# Patient Record
Sex: FEMALE | URBAN - METROPOLITAN AREA
[De-identification: names, ages, dates, MRNs, and addresses within clinical notes are randomized per-mention and may not be internally consistent; named-entity substitution may affect disease eponyms.]

---

## 2020-11-17 ENCOUNTER — APPOINTMENT (RX ONLY)
Dept: URBAN - METROPOLITAN AREA CLINIC 134 | Facility: CLINIC | Age: 65
Setting detail: DERMATOLOGY
End: 2020-11-17

## 2020-11-17 VITALS — TEMPERATURE: 96.98 F

## 2020-11-17 DIAGNOSIS — L30.1 DYSHIDROSIS [POMPHOLYX]: ICD-10-CM

## 2020-11-17 DIAGNOSIS — L26 EXFOLIATIVE DERMATITIS: ICD-10-CM

## 2020-11-17 PROCEDURE — ? COUNSELING

## 2020-11-17 PROCEDURE — ? PRESCRIPTION

## 2020-11-17 PROCEDURE — ? TREATMENT REGIMEN

## 2020-11-17 PROCEDURE — 99213 OFFICE O/P EST LOW 20 MIN: CPT

## 2020-11-17 RX ORDER — FLUOCINONIDE GEL 0.5 MG/G
GEL TOPICAL
Qty: 1 | Refills: 1 | Status: ERX | COMMUNITY
Start: 2020-11-17

## 2020-11-17 RX ADMIN — FLUOCINONIDE GEL: 0.5 GEL TOPICAL at 00:00

## 2020-11-17 ASSESSMENT — LOCATION DETAILED DESCRIPTION DERM
LOCATION DETAILED: LEFT ULNAR PALM
LOCATION DETAILED: RIGHT ULNAR PALM
LOCATION DETAILED: RIGHT THENAR EMINENCE

## 2020-11-17 ASSESSMENT — LOCATION ZONE DERM
LOCATION ZONE: HAND
LOCATION ZONE: HAND

## 2020-11-17 ASSESSMENT — LOCATION SIMPLE DESCRIPTION DERM
LOCATION SIMPLE: LEFT HAND
LOCATION SIMPLE: RIGHT HAND
LOCATION SIMPLE: RIGHT HAND

## 2020-11-17 NOTE — PROCEDURE: TREATMENT REGIMEN
Continue Regimen: Wearing gloves when cleaning and baking
Initiate Treatment: Apply Fluocinonide 0.05% gel BID \\nApply Amlactin* a couple of times during the day
Discontinue Regimen: Keflex 500mg
Detail Level: Zone

## 2021-02-23 ENCOUNTER — APPOINTMENT (RX ONLY)
Dept: URBAN - METROPOLITAN AREA CLINIC 134 | Facility: CLINIC | Age: 66
Setting detail: DERMATOLOGY
End: 2021-02-23

## 2021-02-23 VITALS — TEMPERATURE: 96.8 F

## 2021-02-23 DIAGNOSIS — L98.8 OTHER SPECIFIED DISORDERS OF THE SKIN AND SUBCUTANEOUS TISSUE: ICD-10-CM

## 2021-02-23 PROCEDURE — ? ADDITIONAL NOTES

## 2021-02-23 PROCEDURE — 99213 OFFICE O/P EST LOW 20 MIN: CPT

## 2021-02-23 PROCEDURE — ? PRESCRIPTION

## 2021-02-23 PROCEDURE — ? COUNSELING

## 2021-02-23 PROCEDURE — ? TREATMENT REGIMEN

## 2021-02-23 RX ORDER — EMOLLIENT COMBINATION NO.10
EMULSION (GRAM) TOPICAL
Qty: 1 | Refills: 1 | Status: ERX | COMMUNITY
Start: 2021-02-23

## 2021-02-23 RX ADMIN — Medication: at 00:00

## 2021-02-23 ASSESSMENT — LOCATION DETAILED DESCRIPTION DERM: LOCATION DETAILED: RIGHT PLANTAR FOREFOOT OVERLYING 1ST METATARSAL

## 2021-02-23 ASSESSMENT — LOCATION SIMPLE DESCRIPTION DERM: LOCATION SIMPLE: RIGHT PLANTAR SURFACE

## 2021-02-23 ASSESSMENT — LOCATION ZONE DERM: LOCATION ZONE: FEET

## 2021-02-23 NOTE — PROCEDURE: ADDITIONAL NOTES
Additional Notes: Dry in between toes significantly before wearing socks.  Apply Biafine QID until healed. Her feet tend to sweat a lot so I have suggested that she try and wear wick away socks to try and keep as dry as possible. She may use a pumice stone to remove her callus.
Detail Level: Simple
Render Risk Assessment In Note?: no

## 2021-02-23 NOTE — HPI: SKIN LESION
How Severe Is Your Skin Lesion?: mild
Has Your Skin Lesion Been Treated?: not been treated
Is This A New Presentation, Or A Follow-Up?: Skin Lesion
Additional History: She applied new skin to the area last night and this did burn however she was trying to help it from becoming worse. She is not diabetic.

## 2021-03-25 RX ORDER — FLUOCINONIDE GEL 0.5 MG/G
GEL TOPICAL
Qty: 1 | Refills: 1 | Status: ERX

## 2021-04-01 RX ORDER — EMOLLIENT COMBINATION NO.10
EMULSION (GRAM) TOPICAL
Qty: 1 | Refills: 1 | Status: ERX

## 2022-04-28 ENCOUNTER — APPOINTMENT (RX ONLY)
Dept: URBAN - METROPOLITAN AREA CLINIC 134 | Facility: CLINIC | Age: 67
Setting detail: DERMATOLOGY
End: 2022-04-28

## 2022-04-28 VITALS — HEIGHT: 63 IN | WEIGHT: 293 LBS

## 2022-04-28 DIAGNOSIS — D18.0 HEMANGIOMA: ICD-10-CM

## 2022-04-28 DIAGNOSIS — L85.3 XEROSIS CUTIS: ICD-10-CM

## 2022-04-28 DIAGNOSIS — D22 MELANOCYTIC NEVI: ICD-10-CM

## 2022-04-28 DIAGNOSIS — L91.8 OTHER HYPERTROPHIC DISORDERS OF THE SKIN: ICD-10-CM

## 2022-04-28 DIAGNOSIS — L81.4 OTHER MELANIN HYPERPIGMENTATION: ICD-10-CM

## 2022-04-28 DIAGNOSIS — Z71.89 OTHER SPECIFIED COUNSELING: ICD-10-CM

## 2022-04-28 DIAGNOSIS — L82.1 OTHER SEBORRHEIC KERATOSIS: ICD-10-CM

## 2022-04-28 PROBLEM — D22.5 MELANOCYTIC NEVI OF TRUNK: Status: ACTIVE | Noted: 2022-04-28

## 2022-04-28 PROBLEM — D18.01 HEMANGIOMA OF SKIN AND SUBCUTANEOUS TISSUE: Status: ACTIVE | Noted: 2022-04-28

## 2022-04-28 PROCEDURE — 99213 OFFICE O/P EST LOW 20 MIN: CPT

## 2022-04-28 PROCEDURE — ? COUNSELING

## 2022-04-28 ASSESSMENT — LOCATION ZONE DERM
LOCATION ZONE: TRUNK
LOCATION ZONE: LEG
LOCATION ZONE: ARM
LOCATION ZONE: FACE
LOCATION ZONE: AXILLAE

## 2022-04-28 ASSESSMENT — LOCATION DETAILED DESCRIPTION DERM
LOCATION DETAILED: RIGHT AXILLARY VAULT
LOCATION DETAILED: RIGHT PROXIMAL DORSAL FOREARM
LOCATION DETAILED: LEFT SUPERIOR LATERAL MIDBACK
LOCATION DETAILED: SUPERIOR THORACIC SPINE
LOCATION DETAILED: LEFT PROXIMAL DORSAL FOREARM
LOCATION DETAILED: PERIUMBILICAL SKIN
LOCATION DETAILED: RIGHT ANTERIOR DISTAL THIGH
LOCATION DETAILED: RIGHT INFERIOR CENTRAL MALAR CHEEK

## 2022-04-28 ASSESSMENT — LOCATION SIMPLE DESCRIPTION DERM
LOCATION SIMPLE: RIGHT CHEEK
LOCATION SIMPLE: ABDOMEN
LOCATION SIMPLE: RIGHT FOREARM
LOCATION SIMPLE: UPPER BACK
LOCATION SIMPLE: LEFT LOWER BACK
LOCATION SIMPLE: LEFT FOREARM
LOCATION SIMPLE: RIGHT THIGH
LOCATION SIMPLE: RIGHT AXILLARY VAULT

## 2022-04-28 NOTE — PROCEDURE: COUNSELING
Detail Level: Generalized
Detail Level: Detailed
Patient Specific Counseling (Will Not Stick From Patient To Patient): Gave patient samples of eucerin roughness relief.

## 2023-04-28 ENCOUNTER — APPOINTMENT (RX ONLY)
Dept: URBAN - METROPOLITAN AREA CLINIC 133 | Facility: CLINIC | Age: 68
Setting detail: DERMATOLOGY
End: 2023-04-28

## 2023-04-28 VITALS — WEIGHT: 239 LBS | HEIGHT: 64 IN

## 2023-04-28 DIAGNOSIS — L81.4 OTHER MELANIN HYPERPIGMENTATION: ICD-10-CM

## 2023-04-28 DIAGNOSIS — L91.8 OTHER HYPERTROPHIC DISORDERS OF THE SKIN: ICD-10-CM

## 2023-04-28 DIAGNOSIS — L60.8 OTHER NAIL DISORDERS: ICD-10-CM

## 2023-04-28 PROCEDURE — ? WOOD'S LAMP

## 2023-04-28 PROCEDURE — ? DEFER

## 2023-04-28 PROCEDURE — ? COUNSELING

## 2023-04-28 PROCEDURE — 99213 OFFICE O/P EST LOW 20 MIN: CPT

## 2023-04-28 PROCEDURE — ? TREATMENT REGIMEN

## 2023-04-28 ASSESSMENT — LOCATION DETAILED DESCRIPTION DERM
LOCATION DETAILED: RIGHT AXILLARY VAULT
LOCATION DETAILED: RIGHT MIDDLE FINGERNAIL
LOCATION DETAILED: LEFT INDEX FINGERNAIL
LOCATION DETAILED: LEFT MIDDLE FINGER LUNULA
LOCATION DETAILED: RIGHT INDEX FINGERNAIL
LOCATION DETAILED: RIGHT RING FINGER LUNULA
LOCATION DETAILED: LEFT CENTRAL MALAR CHEEK
LOCATION DETAILED: LEFT RING FINGER LUNULA

## 2023-04-28 ASSESSMENT — LOCATION SIMPLE DESCRIPTION DERM
LOCATION SIMPLE: LEFT INDEX FINGERNAIL
LOCATION SIMPLE: RIGHT RING FINGERNAIL
LOCATION SIMPLE: RIGHT AXILLARY VAULT
LOCATION SIMPLE: LEFT RING FINGERNAIL
LOCATION SIMPLE: LEFT MIDDLE FINGERNAIL
LOCATION SIMPLE: RIGHT INDEX FINGERNAIL
LOCATION SIMPLE: LEFT CHEEK
LOCATION SIMPLE: RIGHT MIDDLE FINGERNAIL

## 2023-04-28 ASSESSMENT — LOCATION ZONE DERM
LOCATION ZONE: FACE
LOCATION ZONE: AXILLAE
LOCATION ZONE: FINGERNAIL

## 2023-04-28 NOTE — HPI: SKIN LESION
What Type Of Note Output Would You Prefer (Optional)?: Standard Output
How Severe Is Your Skin Lesion?: mild
Is This A New Presentation, Or A Follow-Up?: Growth
Is This A New Presentation, Or A Follow-Up?: Skin Lesion
Additional History: States it is a skin tag

## 2023-04-28 NOTE — HPI: NAIL DISORDER
Is This A New Presentation, Or A Follow-Up?: Nail Disorder
How Severe Is It?: mild
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

## 2023-04-28 NOTE — PROCEDURE: TREATMENT REGIMEN
Initiate Treatment: Purchase glass nail buffer from [employer]
Detail Level: Zone
Samples Given: SA cerave psoriasis
Continue Regimen: Tinted sunscreen
Initiate Treatment: SA psoriasis moisturizer, can help lift pigment, daily

## 2023-04-28 NOTE — PROCEDURE: DEFER
Size Of Lesion In Cm (Optional): 0
Procedure To Be Performed At Next Visit: Skin Tag removal (Cosmetic)
Detail Level: Detailed
Introduction Text (Please End With A Colon): The following procedure was deferred:

## 2024-07-10 ENCOUNTER — APPOINTMENT (RX ONLY)
Dept: URBAN - METROPOLITAN AREA CLINIC 133 | Facility: CLINIC | Age: 69
Setting detail: DERMATOLOGY
End: 2024-07-10

## 2024-07-10 DIAGNOSIS — Z41.9 ENCOUNTER FOR PROCEDURE FOR PURPOSES OTHER THAN REMEDYING HEALTH STATE, UNSPECIFIED: ICD-10-CM

## 2024-07-10 PROCEDURE — ? INVENTORY

## 2024-07-10 PROCEDURE — ? DIAMONDGLOW

## 2024-07-10 ASSESSMENT — LOCATION ZONE DERM
LOCATION ZONE: FACE
LOCATION ZONE: NOSE

## 2024-07-10 ASSESSMENT — LOCATION DETAILED DESCRIPTION DERM
LOCATION DETAILED: RIGHT MEDIAL EYEBROW
LOCATION DETAILED: NASAL SUPRATIP

## 2024-07-10 ASSESSMENT — LOCATION SIMPLE DESCRIPTION DERM
LOCATION SIMPLE: NOSE
LOCATION SIMPLE: RIGHT EYEBROW

## 2024-07-10 NOTE — PROCEDURE: DIAMONDGLOW
Detail Level: Zone
Post-Care Instructions: I reviewed with the patient in detail post-care instructions. Patient should stay away from the sun and wear sun protection until treated areas are fully healed.
Intelliflow Settin%
Solution Used: Skin Brightening
Body Treatment Head Used?: Not Used
Comments (Will Appear Before The Postcare): extraction performed on nose
Endpoint: mild erythema
Vacuum Pressure In Inches: 3
Consent: Written consent obtained, risks reviewed including but not limited to crusting, scabbing, blistering, scarring, darker or lighter pigmentary change, bruising, and/or incomplete response.
Facial Treatment Head Used?: Facial: Very Fine (140 grit) 6 mm

## 2024-08-21 ENCOUNTER — APPOINTMENT (RX ONLY)
Dept: URBAN - METROPOLITAN AREA CLINIC 133 | Facility: CLINIC | Age: 69
Setting detail: DERMATOLOGY
End: 2024-08-21

## 2024-08-21 DIAGNOSIS — Z41.9 ENCOUNTER FOR PROCEDURE FOR PURPOSES OTHER THAN REMEDYING HEALTH STATE, UNSPECIFIED: ICD-10-CM

## 2024-08-21 PROCEDURE — ? INVENTORY

## 2024-08-21 PROCEDURE — ? DIAMONDGLOW

## 2024-08-21 ASSESSMENT — LOCATION SIMPLE DESCRIPTION DERM
LOCATION SIMPLE: LEFT FOREHEAD
LOCATION SIMPLE: NOSE

## 2024-08-21 ASSESSMENT — LOCATION DETAILED DESCRIPTION DERM
LOCATION DETAILED: NASAL DORSUM
LOCATION DETAILED: LEFT INFERIOR MEDIAL FOREHEAD

## 2024-08-21 ASSESSMENT — LOCATION ZONE DERM
LOCATION ZONE: FACE
LOCATION ZONE: NOSE

## 2024-08-21 NOTE — PROCEDURE: DIAMONDGLOW
Intelliflow Settin%
Comments (Will Appear Before The Postcare): excoriated lesion on bridge of nose pt reports \"large pore\" present 2+ weeks. Discussed it is likely a sebaceous hyperplasia however may need evaluated/ treated by physician. If worsens or fails to heal pt will schedule evaluation by physician.
Facial Treatment Head Used?: Facial: Fine (120 grit) 6 mm
Consent: Written consent obtained, risks reviewed including but not limited to crusting, scabbing, blistering, scarring, darker or lighter pigmentary change, bruising, and/or incomplete response.
Body Treatment Head Used?: Not Used
Solution Used: Skin Brightening
Vacuum Pressure In Inches: 1.5
Endpoint: mild erythema
Number Of Passes: 3
Post-Care Instructions: I reviewed with the patient in detail post-care instructions. Patient should stay away from the sun and wear sun protection until treated areas are fully healed.
Additional Solution Used: Vitamin C
Detail Level: Zone

## 2024-09-24 ENCOUNTER — APPOINTMENT (RX ONLY)
Dept: URBAN - METROPOLITAN AREA CLINIC 133 | Facility: CLINIC | Age: 69
Setting detail: DERMATOLOGY
End: 2024-09-24

## 2024-09-24 DIAGNOSIS — Z41.9 ENCOUNTER FOR PROCEDURE FOR PURPOSES OTHER THAN REMEDYING HEALTH STATE, UNSPECIFIED: ICD-10-CM

## 2024-09-24 PROCEDURE — ? INVENTORY

## 2024-09-24 PROCEDURE — ? DIAMONDGLOW

## 2024-09-24 ASSESSMENT — LOCATION SIMPLE DESCRIPTION DERM
LOCATION SIMPLE: LEFT CHEEK
LOCATION SIMPLE: LEFT FOREHEAD

## 2024-09-24 ASSESSMENT — LOCATION ZONE DERM: LOCATION ZONE: FACE

## 2024-09-24 ASSESSMENT — LOCATION DETAILED DESCRIPTION DERM
LOCATION DETAILED: LEFT MEDIAL FOREHEAD
LOCATION DETAILED: LEFT INFERIOR CENTRAL MALAR CHEEK

## 2024-09-24 NOTE — PROCEDURE: DIAMONDGLOW
Post-Care Instructions: I reviewed with the patient in detail post-care instructions. Patient should stay away from the sun and wear sun protection until treated areas are fully healed.
Number Of Passes: 3
Intelliflow Settin%
Facial Treatment Head Used?: Facial: Fine (120 grit) 6 mm
Body Treatment Head Used?: Not Used
Vacuum Pressure In Inches: 2.5
Detail Level: Zone
Consent: Written consent obtained, risks reviewed including but not limited to crusting, scabbing, blistering, scarring, darker or lighter pigmentary change, bruising, and/or incomplete response.
Solution Used: Skin Brightening
Endpoint: mild erythema

## 2024-10-23 ENCOUNTER — APPOINTMENT (RX ONLY)
Dept: URBAN - METROPOLITAN AREA CLINIC 133 | Facility: CLINIC | Age: 69
Setting detail: DERMATOLOGY
End: 2024-10-23

## 2024-10-23 DIAGNOSIS — Z41.9 ENCOUNTER FOR PROCEDURE FOR PURPOSES OTHER THAN REMEDYING HEALTH STATE, UNSPECIFIED: ICD-10-CM

## 2024-10-23 PROCEDURE — ? INVENTORY

## 2024-10-23 PROCEDURE — ? LUMENIS M22

## 2024-10-23 ASSESSMENT — LOCATION ZONE DERM: LOCATION ZONE: NOSE

## 2024-10-23 ASSESSMENT — LOCATION DETAILED DESCRIPTION DERM
LOCATION DETAILED: NASAL TIP
LOCATION DETAILED: NASAL DORSUM

## 2024-10-23 ASSESSMENT — LOCATION SIMPLE DESCRIPTION DERM: LOCATION SIMPLE: NOSE

## 2024-10-23 NOTE — PROCEDURE: LUMENIS M22
Post-Care Instructions: I reviewed with the patient in detail post-care instructions. Patient should stay away from the sun and wear sun protection until treated areas are fully healed.
Pulse Duration (In Milliseconds): 4
Depth: shallow
Treatment Number: 1
Fluence: 17
Consent: Written consent obtained, risks reviewed including but not limited to crusting, scabbing, blistering, scarring, darker or lighter pigmentary change, bruising, and/or incomplete response.
Fluence Units: J/cm2
Skin Type (Optional): IV
External Cooling Fan Speed: 0
Procedure Text: The patient's skin was cleaned and the procedure was performed using the parameters noted above.
Detail Level: Simple
Treated Area: medium area

## 2025-04-02 ENCOUNTER — APPOINTMENT (OUTPATIENT)
Dept: URBAN - METROPOLITAN AREA CLINIC 133 | Facility: CLINIC | Age: 70
Setting detail: DERMATOLOGY
End: 2025-04-02

## 2025-05-01 ENCOUNTER — APPOINTMENT (OUTPATIENT)
Dept: URBAN - METROPOLITAN AREA CLINIC 133 | Facility: CLINIC | Age: 70
Setting detail: DERMATOLOGY
End: 2025-05-01

## 2025-05-01 DIAGNOSIS — N95.2 POSTMENOPAUSAL ATROPHIC VAGINITIS: ICD-10-CM

## 2025-05-01 DIAGNOSIS — L82.1 OTHER SEBORRHEIC KERATOSIS: ICD-10-CM

## 2025-05-01 DIAGNOSIS — D22 MELANOCYTIC NEVI: ICD-10-CM

## 2025-05-01 DIAGNOSIS — Q826 OTHER SPECIFIED ANOMALIES OF SKIN: ICD-10-CM

## 2025-05-01 DIAGNOSIS — Z71.89 OTHER SPECIFIED COUNSELING: ICD-10-CM

## 2025-05-01 DIAGNOSIS — Q819 OTHER SPECIFIED ANOMALIES OF SKIN: ICD-10-CM

## 2025-05-01 DIAGNOSIS — D18.0 HEMANGIOMA: ICD-10-CM

## 2025-05-01 DIAGNOSIS — L73.8 OTHER SPECIFIED FOLLICULAR DISORDERS: ICD-10-CM

## 2025-05-01 DIAGNOSIS — Q828 OTHER SPECIFIED ANOMALIES OF SKIN: ICD-10-CM

## 2025-05-01 DIAGNOSIS — L81.4 OTHER MELANIN HYPERPIGMENTATION: ICD-10-CM

## 2025-05-01 PROBLEM — D18.01 HEMANGIOMA OF SKIN AND SUBCUTANEOUS TISSUE: Status: ACTIVE | Noted: 2025-05-01

## 2025-05-01 PROBLEM — L85.8 OTHER SPECIFIED EPIDERMAL THICKENING: Status: ACTIVE | Noted: 2025-05-01

## 2025-05-01 PROBLEM — D22.61 MELANOCYTIC NEVI OF RIGHT UPPER LIMB, INCLUDING SHOULDER: Status: ACTIVE | Noted: 2025-05-01

## 2025-05-01 PROBLEM — D22.62 MELANOCYTIC NEVI OF LEFT UPPER LIMB, INCLUDING SHOULDER: Status: ACTIVE | Noted: 2025-05-01

## 2025-05-01 PROCEDURE — ? TREATMENT REGIMEN

## 2025-05-01 PROCEDURE — ? COUNSELING

## 2025-05-01 PROCEDURE — 99213 OFFICE O/P EST LOW 20 MIN: CPT

## 2025-05-01 PROCEDURE — ? SUNSCREEN RECOMMENDATIONS

## 2025-05-01 ASSESSMENT — LOCATION ZONE DERM
LOCATION ZONE: SCALP
LOCATION ZONE: FACE
LOCATION ZONE: EAR
LOCATION ZONE: LEG
LOCATION ZONE: TRUNK
LOCATION ZONE: ARM

## 2025-05-01 ASSESSMENT — LOCATION DETAILED DESCRIPTION DERM
LOCATION DETAILED: RIGHT MEDIAL SUPERIOR CHEST
LOCATION DETAILED: LEFT DISTAL DORSAL FOREARM
LOCATION DETAILED: LEFT INFERIOR CENTRAL MALAR CHEEK
LOCATION DETAILED: INFERIOR THORACIC SPINE
LOCATION DETAILED: LEFT INFERIOR MEDIAL MIDBACK
LOCATION DETAILED: RIGHT MID-UPPER BACK
LOCATION DETAILED: LEFT ANTERIOR SHOULDER
LOCATION DETAILED: LEFT INFERIOR FRONTAL SCALP
LOCATION DETAILED: LEFT SUPERIOR UPPER BACK
LOCATION DETAILED: LEFT PROXIMAL POSTERIOR UPPER ARM
LOCATION DETAILED: SUPERIOR THORACIC SPINE
LOCATION DETAILED: RIGHT PROXIMAL PRETIBIAL REGION
LOCATION DETAILED: RIGHT ANTERIOR MEDIAL PROXIMAL THIGH
LOCATION DETAILED: LEFT ANTERIOR PROXIMAL THIGH
LOCATION DETAILED: PERIUMBILICAL SKIN
LOCATION DETAILED: RIGHT PROXIMAL DORSAL FOREARM
LOCATION DETAILED: RIGHT ANTERIOR SHOULDER
LOCATION DETAILED: LEFT PROXIMAL PRETIBIAL REGION
LOCATION DETAILED: UPPER STERNUM
LOCATION DETAILED: RIGHT CENTRAL MALAR CHEEK
LOCATION DETAILED: LEFT ANTERIOR EARLOBE
LOCATION DETAILED: LEFT MEDIAL UPPER BACK

## 2025-05-01 ASSESSMENT — LOCATION SIMPLE DESCRIPTION DERM
LOCATION SIMPLE: LEFT EAR
LOCATION SIMPLE: LEFT SHOULDER
LOCATION SIMPLE: CHEST
LOCATION SIMPLE: ABDOMEN
LOCATION SIMPLE: RIGHT PRETIBIAL REGION
LOCATION SIMPLE: LEFT POSTERIOR UPPER ARM
LOCATION SIMPLE: RIGHT CHEEK
LOCATION SIMPLE: RIGHT FOREARM
LOCATION SIMPLE: LEFT FOREARM
LOCATION SIMPLE: LEFT UPPER BACK
LOCATION SIMPLE: LEFT PRETIBIAL REGION
LOCATION SIMPLE: UPPER BACK
LOCATION SIMPLE: LEFT CHEEK
LOCATION SIMPLE: RIGHT SHOULDER
LOCATION SIMPLE: SCALP
LOCATION SIMPLE: LEFT THIGH
LOCATION SIMPLE: LEFT LOWER BACK
LOCATION SIMPLE: RIGHT UPPER BACK
LOCATION SIMPLE: RIGHT THIGH

## 2025-05-01 NOTE — PROCEDURE: SUNSCREEN RECOMMENDATIONS
General Sunscreen Counseling: Recommended a broad spectrum sunscreen with a SPF of 30 or higher, preferably zinc-oxide or mineral based sunscreens, and preferred products were recommended. Sunscreens should be applied at least 15 minutes prior to expected sun exposure and then every 2 hours after that as long as sun exposure continues. If swimming or exercising sunscreen should be reapplied every 45 minutes to an hour after getting wet or sweating. One ounce, or the equivalent of a shot glass full of sunscreen, is adequate to protect the skin not covered by a bathing suit. Sun protection should include everywhere sun exposure occurs, including lips. Sun protective clothing can be used in lieu of sunscreen. Encouraged sun protective behaviors and that sun exposure and resultant damage can even occur ambiently often through windows or in short stints outside
Detail Level: Zone
Detail Level: Generalized
General Sunscreen Counseling: Recommended a broad spectrum sunscreen with a SPF of 30 or higher, preferably zinc-oxide or mineral based sunscreens, and preferred products were recommended. Sunscreens should be applied at least 15 minutes prior to expected sun exposure and then every 2 hours after that as long as sun exposure continues. If swimming or exercising sunscreen should be reapplied every 45 minutes to an hour after getting wet or sweating. One ounce, or the equivalent of a shot glass full of sunscreen, is adequate to protect the skin not covered by a bathing suit. Sun protection should include everywhere sun exposure occurs, including lips. Sun protective clothing can be used in lieu of sunscreen. Encouraged sun protective behaviors and that sun exposure and resultant damage can even occur ambiently often through windows or in short stints outside.

## 2025-05-01 NOTE — HPI: EVALUATION OF SKIN LESION(S)
What Type Of Note Output Would You Prefer (Optional)?: Standard Output
Hpi Title: Evaluation of Skin Lesions
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

## 2025-05-02 ENCOUNTER — APPOINTMENT (OUTPATIENT)
Dept: URBAN - METROPOLITAN AREA CLINIC 133 | Facility: CLINIC | Age: 70
Setting detail: DERMATOLOGY
End: 2025-05-02

## 2025-05-02 DIAGNOSIS — Z41.9 ENCOUNTER FOR PROCEDURE FOR PURPOSES OTHER THAN REMEDYING HEALTH STATE, UNSPECIFIED: ICD-10-CM

## 2025-05-02 PROCEDURE — ? DIAMONDGLOW

## 2025-05-02 PROCEDURE — ? INVENTORY

## 2025-05-02 ASSESSMENT — LOCATION SIMPLE DESCRIPTION DERM
LOCATION SIMPLE: NOSE
LOCATION SIMPLE: INFERIOR FOREHEAD

## 2025-05-02 ASSESSMENT — LOCATION DETAILED DESCRIPTION DERM
LOCATION DETAILED: INFERIOR MID FOREHEAD
LOCATION DETAILED: NASAL SUPRATIP

## 2025-05-02 ASSESSMENT — LOCATION ZONE DERM
LOCATION ZONE: FACE
LOCATION ZONE: NOSE

## 2025-05-02 NOTE — PROCEDURE: DIAMONDGLOW
Intelliflow Settin%
Body Treatment Head Used?: Not Used
Facial Treatment Head Used?: Facial: Very Fine (140 grit) 6 mm
Detail Level: Zone
Consent: Written consent obtained, risks reviewed including but not limited to crusting, scabbing, blistering, scarring, darker or lighter pigmentary change, bruising, and/or incomplete response.
Number Of Passes: 3
Post-Care Instructions: I reviewed with the patient in detail post-care instructions. Patient should stay away from the sun and wear sun protection until treated areas are fully healed.
Endpoint: mild erythema
Vacuum Pressure In Inches: 1.5
Solution Used: Skin Brightening

## 2025-08-08 ENCOUNTER — APPOINTMENT (OUTPATIENT)
Dept: URBAN - METROPOLITAN AREA CLINIC 133 | Facility: CLINIC | Age: 70
Setting detail: DERMATOLOGY
End: 2025-08-08

## 2025-08-08 DIAGNOSIS — Z41.9 ENCOUNTER FOR PROCEDURE FOR PURPOSES OTHER THAN REMEDYING HEALTH STATE, UNSPECIFIED: ICD-10-CM

## 2025-08-08 PROCEDURE — ? INVENTORY

## 2025-08-08 PROCEDURE — ? DIAMONDGLOW

## 2025-08-08 ASSESSMENT — LOCATION DETAILED DESCRIPTION DERM
LOCATION DETAILED: LEFT MEDIAL FOREHEAD
LOCATION DETAILED: LEFT INFERIOR MEDIAL FOREHEAD

## 2025-08-08 ASSESSMENT — LOCATION ZONE DERM: LOCATION ZONE: FACE

## 2025-08-08 ASSESSMENT — LOCATION SIMPLE DESCRIPTION DERM: LOCATION SIMPLE: LEFT FOREHEAD
